# Patient Record
Sex: MALE | Race: WHITE
[De-identification: names, ages, dates, MRNs, and addresses within clinical notes are randomized per-mention and may not be internally consistent; named-entity substitution may affect disease eponyms.]

---

## 2017-06-12 ENCOUNTER — HOSPITAL ENCOUNTER (EMERGENCY)
Dept: HOSPITAL 80 - FED | Age: 82
Discharge: HOME | End: 2017-06-12
Payer: COMMERCIAL

## 2017-06-12 VITALS
DIASTOLIC BLOOD PRESSURE: 62 MMHG | HEART RATE: 68 BPM | TEMPERATURE: 98.6 F | SYSTOLIC BLOOD PRESSURE: 155 MMHG | OXYGEN SATURATION: 94 %

## 2017-06-12 VITALS — RESPIRATION RATE: 16 BRPM

## 2017-06-12 DIAGNOSIS — S46.002A: Primary | ICD-10-CM
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
## 2017-06-12 DIAGNOSIS — Z79.01: ICD-10-CM

## 2017-06-12 PROCEDURE — 99284 EMERGENCY DEPT VISIT MOD MDM: CPT

## 2017-06-12 PROCEDURE — A4565 SLINGS: HCPCS

## 2017-06-12 PROCEDURE — 73221 MRI JOINT UPR EXTREM W/O DYE: CPT

## 2017-06-12 NOTE — EDPHY
H & P


Time Seen by Provider: 06/12/17 17:28


HPI/ROS: 


CHIEF COMPLAINT:  Left shoulder pain





HISTORY OF PRESENT ILLNESS:  This is a 81-year-old male presenting to the 

emergency room from [facility] for left shoulder and 

trapezius pain. Patient's son is at the bedside he reports patient took a fall 1

-2 months ago hyperextended his left arm initial x-rays were negative but 

questioning whether he tore his rotator cuff to the amount of pain he is having 

with range of motion, pain radiates to his trapezius.  Patient states has been 

having intermittent pain since his fall but since Friday pain has increased 

with increased pain to his left trapezius.  Denies any new injury, no other 

complaints





REVIEW OF SYSTEMS:


Constitutional:  No fever, no chills.


Eyes:  No discharge. No blurred vision


ENT:  No sore throat.


Cardiovascular:  No chest pain, no palpitations.


Respiratory:  No cough, no shortness of breath.


Gastrointestinal:  No abdominal pain, no vomiting.


Genitourinary:  No hematuria.


Musculoskeletal:  No back pain. Left shoulder pain


Skin:  No rashes.


Neurological:  No headache.


Smoking Status: Never smoked


Physical Exam: 


General Appearance:  Alert, no distress.


Eyes:  Pupils equal and round no pallor or injection.


ENT, Mouth:  Mucous membranes moist.


Respiratory:  There are no retractions, lungs are clear to auscultation.


Cardiovascular:  Regular rate and rhythm.


Gastrointestinal:  Abdomen is soft and nontender, no masses, bowel sounds 

normal.


Neurological:  No focal deficits


Skin:  Warm and dry, no rashes.


Musculoskeletal:  Vertebral cervical spine nontender on palpation full range of 

motion without difficulty.  left trapezius tenderness on palpation, left 

shoulder pain with range of motion no obvious deformity positive CMS intact


Extremities:  symmetrical, decreased range of motion left shoulder joint no 

swelling positive CMS intact


Psychiatric:  Patient is oriented X 3, patient acting appropriate





Constitutional: 


 Initial Vital Signs











Temperature (C)  37.2 C   06/12/17 17:28


 


Heart Rate  78   06/12/17 17:28


 


Respiratory Rate  16   06/12/17 17:28


 


Blood Pressure  167/78 H  06/12/17 17:28


 


O2 Sat (%)  96   06/12/17 17:28








 











O2 Delivery Mode               Room Air














Allergies/Adverse Reactions: 


 





No Known Allergies Allergy (Verified 12/12/16 17:39)


 








Home Medications: 














 Medication  Instructions  Recorded


 


Acetaminophen [Acetaminophen Extra 500 mg PO Q6H PRN 12/12/16





Strength]  


 


Cholecalciferol Vit D3 [Vitamin D3 50,000 unit PO Q7D 12/12/16





(*)]  


 


Cyanocobalamin [Vitamin B12 (*)] 1,000 mcg PO DAILY@08 12/12/16


 


Fenofibrate [Tricor 145 mg (*)] 145 mg PO DAILY@20 12/12/16


 


Ferrous Sulfate [Ferrous Sulf 325 325 mg PO TID 12/12/16





MG (*)]  


 


Finasteride [Proscar 5 MG (*)] 5 mg PO DAILY@08 12/12/16


 


Pantoprazole Sodium [Protonix 40mg 40 mg PO BID 12/12/16





(*)]  


 


Pregabalin [Lyrica] 25 mg PO TID 12/12/16


 


Sertraline HCl [Zoloft 50mg (*)] 50 mg PO DAILY 12/12/16


 


Tamsulosin HCl [Flomax 0.4 MG (*)] 0.8 mg PO DAILY@17 12/12/16


 


metFORMIN HCL [Metformin HCl ER] 1,000 mg PO BIDMEAL 12/12/16


 


traMADol [Ultram 50 mg (*)] 50 mg PO Q6H PRN 12/12/16


 


Enoxaparin [Lovenox 80 MG (*)] 80 mg SC BID #14 syr 12/13/16


 


Warfarin Sodium [Coumadin 5MG (*)] 5 mg PO DAILY AT 4PM #0 tab 12/13/16


 


Lidocaine 5% [Lidoderm 5% Patch 1 ea TD DAILY #3 patch 06/12/17





(*)]  














Medical Decision Making





- Diagnostics


Imaging Results: 


 Imaging Impressions





Upper Extremity MRI  06/12/17 17:44


Impression: 


1. Large full-thickness tear involving the majority of the distal supraspinatus 

tendon retracted 2 cm. There is probably acute and chronic component with 

atrophy and edema. Moderate tendinopathy with mild partial tear infraspinatus 

tendon. Subacromial/subdeltoid bursitis.


2. Moderate tendinopathy intra-articular long head of the biceps tendon.


3. Partial tear posterior superior labrum and fraying and possible partial tear 

anterior inferior labrum. Minimal early degenerative change glenohumeral joint. 

Glenohumeral joint effusion.


4. Mild to moderate tendinopathy and partial tear distal subscapularis tendon.


5. Severe degenerative change acromioclavicular joint. Anterior curve to the 

acromion. Subacromial spur.


 


Results called and discussed with Bina Henson NP on 6/12/2017 at 1915 hours.











ED Course/Re-evaluation: 


Discussed ED  plan of care with patient and son:  Pain medicine, MRI of left 

shoulder rule out any rotator cuff injury





1915:  Spoke with Dr. Arzate in regards to findings on MRI for rotator cuff 

injury





1945:  Discussed these results with patient and patient's son, patient will go 

home with lidocaine patch and sling.  Patient has prescription for tramadol at 

[facility] he take that as needed with ibuprofen.  Also 

given number for Orthopedics to follow up with them.  Discussed all discharge 

instructions with patient and son---> stable, discharge home


Differential Diagnosis: 


Other differential diagnosis considered but not limited to shoulder dislocation

, clavicle fracture, and other ligamentous injuries





- Data Points


Medications Given: 


 








Discontinued Medications





Lidocaine (Lidoderm 5%)  1 ea TD DAILY MEL


   Stop: 12/10/17 08:59


   Last Admin: 06/12/17 20:34 Dose:  1 ea


Oxycodone/Acetaminophen (Percocet 5/325)  1 tab PO EDNOW ONE


   Stop: 06/12/17 17:44


   Last Admin: 06/12/17 17:54 Dose:  1 tab








Departure





- Departure


Disposition: Home, Routine, Self-Care


Clinical Impression: 


Injury of muscle or tendon of rotator cuff


Qualifiers:


 Encounter type: initial encounter Laterality: left Qualified Code(s): S46.002A 

- Unspecified injury of muscle(s) and tendon(s) of the rotator cuff of left 

shoulder, initial encounter





Condition: Good


Instructions:  Rotator Cuff Injury (ED), Rotator Cuff Tendinitis (ED)


Additional Instructions: 


1.  Follow up with Orthopedics this week


2.  You also have a prescription of tramadol take it as needed


Referrals: 


Patient,NotPresent [Unknown] - As per Instructions


[facility],. [Clinic] - As per Instructions


Dickson Rueda MD [Medical Doctor] - As per Instructions


Prescriptions: 


Lidocaine 5% [Lidoderm 5% Patch (*)] 1 ea TD DAILY #3 patch

## 2017-12-27 ENCOUNTER — HOSPITAL ENCOUNTER (EMERGENCY)
Dept: HOSPITAL 80 - FED | Age: 82
Discharge: HOME | End: 2017-12-27
Payer: COMMERCIAL

## 2017-12-27 VITALS
DIASTOLIC BLOOD PRESSURE: 76 MMHG | SYSTOLIC BLOOD PRESSURE: 169 MMHG | TEMPERATURE: 98.8 F | RESPIRATION RATE: 16 BRPM | OXYGEN SATURATION: 93 % | HEART RATE: 72 BPM

## 2017-12-27 DIAGNOSIS — S01.81XA: Primary | ICD-10-CM
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
## 2017-12-27 DIAGNOSIS — Z23: ICD-10-CM

## 2017-12-27 DIAGNOSIS — I10: ICD-10-CM

## 2017-12-27 DIAGNOSIS — Z79.01: ICD-10-CM
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
## 2017-12-27 LAB
INR PPP: 1.9 (ref 0.83–1.16)
PLATELET # BLD: 154 10^3/UL (ref 150–400)
PROTHROMBIN TIME: 21.9 SEC (ref 12–15)

## 2017-12-27 PROCEDURE — 0HQ1XZZ REPAIR FACE SKIN, EXTERNAL APPROACH: ICD-10-PCS

## 2017-12-27 NOTE — ASMTCMCOM
CM Note

 

CM Note                       

Notes:

Patient's son (Dickson) contacted and informed of patient's visit to the ER [phone].  He is 

unavailable to pick patient up at this time.  Transporation back to [facility] arranged with 

Lisa [phone] at .  

 

Date Signed:  12/27/2017 02:17 PM

Electronically Signed By:Brenda Mccormack RN

## 2017-12-27 NOTE — EDPHY
H & P


Stated Complaint: LTA, HEAD LAC


Source: Patient


Exam Limitations: No limitations





- Personal History


Current Tetanus/Diphtheria Vaccine: Unsure





- Medical/Surgical History


Hx Asthma: No


Hx Chronic Respiratory Disease: No


Hx Diabetes: No


Hx Cardiac Disease: No


Hx Renal Disease: No


Hx Cirrhosis: No


Hx Alcoholism: No


Hx HIV/AIDS: No


Hx Splenectomy or Spleen Trauma: No


Other PMH: HTN, cholecystectomy, anemia, fall 11/16





- Social History


Smoking Status: Never smoked


Time Seen by Provider: 12/27/17 11:26


HPI/ROS: 





CHIEF COMPLAINT:  Limited trauma activation, mechanical fall, head injury





HISTORY OF PRESENT ILLNESS:  The patient is anticoagulated for history of 

thromboembolic disease.  He presents to the emergency department as a limited 

trauma activation after he sustained a mechanical fall while walking earlier 

today.  The patient fell forward striking his head.  There was unknown loss of 

consciousness.  He sustained a laceration over his right eyebrow.  The patient 

denies any acute numbness or weakness. He denies antecedent chest pain or 

palpitations.  He denies any chest pain, back pain or extremity complaints.  

The patient does complain of a mild frontal headache. He does complain of mild 

cervical spine pain.





REVIEW OF SYSTEMS:


A comprehensive 10 point review of systems is otherwise negative aside from 

elements mentioned in the history of present illness. (Earl Leonard)





- Physical Exam


Exam: 





General Appearance:  Alert, no distress


Head:  5 cm laceration noted over the right eyebrow, stellate


Eyes:  Pupils equal, round, reactive


ENT, Mouth:  No hemotympanum, no oral trauma


Neck:  Nontender, trachea midline


Respiratory:  No chest wall tender, subcutaneous air, lungs clear bilaterally


Cardiovascular:  Regular rate and rhythm


Abdomen:  Abdomen is soft and nontender, pelvis stable


Skin:  Superficial extremity abrasions


Back:  No midline T/L/S pain


Extremities:  Nontender, full range of motion


Neurological:  A&Ox3, normal motor function, normal sensory exam (Earl Leonard)


Constitutional: 


 Initial Vital Signs











Temperature (C)  37.1 C   12/27/17 11:25


 


Heart Rate  72   12/27/17 11:25


 


Respiratory Rate  16   12/27/17 11:25


 


Blood Pressure  169/76 H  12/27/17 11:25


 


O2 Sat (%)  93   12/27/17 11:25








 











O2 Delivery Mode               Room Air














Allergies/Adverse Reactions: 


 





No Known Allergies Allergy (Verified 12/12/16 17:39)


 








Home Medications: 














 Medication  Instructions  Recorded


 


Acetaminophen [Acetaminophen Extra 500 mg PO Q6H PRN 12/12/16





Strength]  


 


Cholecalciferol Vit D3 [Vitamin D3 50,000 unit PO Q7D 12/12/16





(*)]  


 


Cyanocobalamin [Vitamin B12 (*)] 1,000 mcg PO DAILY@08 12/12/16


 


Fenofibrate [Tricor 145 mg (*)] 145 mg PO DAILY@20 12/12/16


 


Ferrous Sulfate [Ferrous Sulf 325 325 mg PO TID 12/12/16





MG (*)]  


 


Finasteride [Proscar 5 MG (*)] 5 mg PO DAILY@08 12/12/16


 


Pantoprazole Sodium [Protonix 40mg 40 mg PO BID 12/12/16





(*)]  


 


Pregabalin [Lyrica] 25 mg PO TID 12/12/16


 


Sertraline HCl [Zoloft 50mg (*)] 50 mg PO DAILY 12/12/16


 


Tamsulosin HCl [Flomax 0.4 MG (*)] 0.8 mg PO DAILY@17 12/12/16


 


metFORMIN HCL [Metformin HCl ER] 1,000 mg PO BIDMEAL 12/12/16


 


traMADol [Ultram 50 mg (*)] 50 mg PO Q6H PRN 12/12/16


 


Enoxaparin [Lovenox 80 MG (*)] 80 mg SC BID #14 syr 12/13/16


 


Warfarin Sodium [Coumadin 5MG (*)] 5 mg PO DAILY AT 4PM #0 tab 12/13/16


 


Lidocaine 5% [Lidoderm 5% Patch 1 ea TD DAILY #3 patch 06/12/17





(*)]  














Medical Decision Making





- Diagnostics


Imaging Results: 


 Imaging Impressions





Cervical Spine CT  12/27/17 11:29


Impression:    


1. Stable mild age-related atrophy.


2. No hemorrhage, mass effect, or definite acute peripheral infarct.


3. No evidence of acute cervical spine injury.


4. Stable degenerative disk disease.


 


If symptoms worsen, additional imaging may be necessary. 


 


Findings discussed with Earl Leonard  at  12:11 hour, 12/27/2017.








Head CT  12/27/17 11:29


Impression:    


1. Stable mild age-related atrophy.


2. No hemorrhage, mass effect, or definite acute peripheral infarct.


3. No evidence of acute cervical spine injury.


4. Stable degenerative disk disease.


 


If symptoms worsen, additional imaging may be necessary. 


 


Findings discussed with Earl Leonard  at  12:11 hour, 12/27/2017.











Procedures: 





Procedure:  Laceration repair.





Verbal consent was obtained from the patient. The 5 cm laceration on the right 

forehead was anesthetized in the usual fashion. The wound was irrigated, draped 

and explored to its base with a gloved finger. There were no deep structures 

involved.  No tendon injury was identified.  The wound was repaired with 5-0 

rapidly dissolving Vicryl, 9 deep sutures, 5 0 Prolene, 8 simple interrupted 

sutures.  The wound repair was moderately complex multilayer closure.  The 

procedure was performed by myself. (Rock Colon)


ED Course/Re-evaluation: 





The patient presents to the ED is limited trauma activation after mechanical 

fall.  The patient is anticoagulated in sustained a head injury and associated 

laceration.  The patient arrived and was noted to be neurologically intact.  

Given his age in anticoagulation status he was taken for a CT scan of the head 

which demonstrates no evidence of intracranial hemorrhage or skull fracture.  

The patient did have some mild midline neck tenderness and a CT scan of the 

cervical spine was also performed which demonstrated no evidence of an acute 

fracture.  The patient had no additional traumatic injuries noted on his 

examination. The patient's laceration was repaired by the physician assistant 

under my supervision.





The patient underwent serial examinations in the ED over a 2 hr period.  At 2:

00 p.m. he is in no acute distress.  His laceration has been repaired.  No 

additional traumatic injury is appreciated on a repeat tertiary survey.





The patient will be discharged home with instructions to return to the ED in 5 

days for suture removal.  He should return sooner for any headache, vomiting, 

abnormal behavior, new pain or other concerns.





The patient will be transferred back to [facility].  He will follow up with 

his physician Dr. Villegas as scheduled.





 (Earl Leonard)


Differential Diagnosis: 





Differential diagnosis considered includes intracranial hemorrhage, skull 

fracture, cervical spine fracture, spinal cord injury, neurovascular injury, 

anemia, metabolic abnormality (Earl Leonard)





- Data Points


Laboratory Results: 


 Laboratory Results





 12/27/17 12:00 





 12/27/17 12:00 





 











  12/27/17 12/27/17 12/27/17





  12:00 12:00 12:00


 


WBC      6.96 10^3/uL 10^3/uL





     (3.80-9.50) 


 


RBC      3.48 10^6/uL L 10^6/uL





     (4.40-6.38) 


 


Hgb      11.3 g/dL L g/dL





     (13.7-17.5) 


 


Hct      35.0 % L %





     (40.0-51.0) 


 


MCV      100.6 fL H fL





     (81.5-99.8) 


 


MCH      32.5 pg pg





     (27.9-34.1) 


 


MCHC      32.3 g/dL L g/dL





     (32.4-36.7) 


 


RDW      14.0 % %





     (11.5-15.2) 


 


Plt Count      154 10^3/uL 10^3/uL





     (150-400) 


 


MPV      10.3 fL fL





     (8.7-11.7) 


 


Neut % (Auto)      74.8 % H %





     (39.3-74.2) 


 


Lymph % (Auto)      16.1 % %





     (15.0-45.0) 


 


Mono % (Auto)      6.2 % %





     (4.5-13.0) 


 


Eos % (Auto)      1.7 % %





     (0.6-7.6) 


 


Baso % (Auto)      0.6 % %





     (0.3-1.7) 


 


Nucleat RBC Rel Count      0.0 % %





     (0.0-0.2) 


 


Absolute Neuts (auto)      5.21 10^3/uL 10^3/uL





     (1.70-6.50) 


 


Absolute Lymphs (auto)      1.12 10^3/uL 10^3/uL





     (1.00-3.00) 


 


Absolute Monos (auto)      0.43 10^3/uL 10^3/uL





     (0.30-0.80) 


 


Absolute Eos (auto)      0.12 10^3/uL 10^3/uL





     (0.03-0.40) 


 


Absolute Basos (auto)      0.04 10^3/uL 10^3/uL





     (0.02-0.10) 


 


Absolute Nucleated RBC      0.00 10^3/uL 10^3/uL





     (0-0.01) 


 


Immature Gran %      0.6 % %





     (0.0-1.1) 


 


Immature Gran #      0.04 10^3/uL 10^3/uL





     (0.00-0.10) 


 


PT    21.9 SEC H SEC  





    (12.0-15.0)  


 


INR    1.90  H   





    (0.83-1.16)  


 


APTT    29.2 SEC SEC  





    (23.0-38.0)  


 


Sodium  142 mEq/L mEq/L    





   (134-144)   


 


Potassium  4.1 mEq/L mEq/L    





   (3.5-5.2)   


 


Chloride  106 mEq/L mEq/L    





   ()   


 


Carbon Dioxide  25 mEq/l mEq/l    





   (22-31)   


 


Anion Gap  11 mEq/L mEq/L    





   (8-16)   


 


BUN  13 mg/dL mg/dL    





   (7-23)   


 


Creatinine  0.9 mg/dL mg/dL    





   (0.7-1.3)   


 


Estimated GFR  > 60     





    


 


Glucose  125 mg/dL H mg/dL    





   ()   


 


Calcium  8.6 mg/dL mg/dL    





   (8.5-10.4)   














Departure





- Departure


Disposition: Home, Routine, Self-Care


Clinical Impression: 


 Fall, Facial laceration





Condition: Good


Instructions:  Care For Your Stitches (ED), Laceration (ED), Head Injury (ED)


Additional Instructions: 


1.  Suture removal in 5 days, you can return to the emergency department to 

have this performed.


2.  Return to the ED sooner for any severe headache, vomiting, numbness, 

weakness, new pain or other concerns.


3.  Please follow up with your primary care provider as scheduled.

## 2018-09-06 ENCOUNTER — HOSPITAL ENCOUNTER (EMERGENCY)
Dept: HOSPITAL 80 - FED | Age: 83
Discharge: HOME | End: 2018-09-06
Payer: COMMERCIAL

## 2018-09-06 VITALS — DIASTOLIC BLOOD PRESSURE: 62 MMHG | SYSTOLIC BLOOD PRESSURE: 144 MMHG
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
## 2018-09-06 DIAGNOSIS — S70.02XA: Primary | ICD-10-CM
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
## 2018-09-06 NOTE — ASMTCMCOM
CM Note

 

CM Note                       

Notes:

9/6/2018 Case Management Note



RN requested assistance with transport.  Pt resides at [facility].  Phone call to [facility], instructed by [facility] staff to arrange transport with [facility] stretcher to bill [facility].  Phone call to [facility].   at 2030 tonight.  PCS completed indicating [facility] to be 

billed for transport costs.



No further case management d/c needs identified.

 

Date Signed:  09/06/2018 06:56 PM

Electronically Signed By:Davida Fuller RN

## 2018-09-06 NOTE — EDPHY
H & P


Time Seen by Provider: 18 18:00


Constitutional: 


 Initial Vital Signs











Temperature (C)  36.8 C   18 18:01


 


Heart Rate  74   18 18:01


 


Respiratory Rate  18   18 18:01


 


Blood Pressure  147/66 H  18 18:01


 


O2 Sat (%)  95   18 18:01








 











O2 Delivery Mode               Room Air














Allergies/Adverse Reactions: 


 





No Known Allergies Allergy (Verified 16 17:39)


 








Home Medications: 














 Medication  Instructions  Recorded


 


Acetaminophen [Acetaminophen Extra 500 mg PO Q6H PRN 16





Strength]  


 


Cholecalciferol Vit D3 [Vitamin D3 50,000 unit PO Q7D 16





(*)]  


 


Cyanocobalamin [Vitamin B12 (*)] 1,000 mcg PO DAILY@08 16


 


Finasteride [Proscar 5 MG (*)] 5 mg PO DAILY@16


 


Pantoprazole Sodium [Protonix 40mg 40 mg PO BID 16





(*)]  


 


Pregabalin [Lyrica] 25 mg PO TID 16


 


Sertraline HCl [Zoloft 50mg (*)] 50 mg PO DAILY 16


 


Tamsulosin HCl [Flomax 0.4 MG (*)] 0.8 mg PO DAILY@17 16


 


metFORMIN HCL [Metformin HCl ER] 1,000 mg PO BIDMEAL 16


 


traMADol [Ultram 50 mg (*)] 50 mg PO Q6H PRN 16


 


Acetaminophen Extra Strength  18


 


Trolamine Salicylate/Aloe Vera  18





[Aspercreme 10% Cream]  














Medical Decision Making





- Diagnostics


Imaging Results: 


 Imaging Impressions





Hip X-Ray  18 18:04


Impression: Heterotopic bone formation over the left greater trochanter 

compatible prior trauma. No acute fracture identified. Right sacroiliac joint 

osteoarthritis.











Imaging: I viewed and interpreted images myself


ED Course/Re-evaluation: 





CHIEF COMPLAINT:  Left hip pain 





HISTORY OF PRESENT ILLNESS: 





This patient is an 82 arriving via EMS from [facility] who complains of left 

hip pain following a mechanical fall earlier today. His wife fell this evening 

and he tried to help her get up. He fell backwards onto his left hip. He 

endorses discomfort in his left hip and left inguinal region. He denies any 

other injuries from today's fall. He felt well prior to the incident, no 

lightheadedness, chest pain, shortness of breath. The fall was purely 

mechanical. No other recent trauma or illness. No further complaints. 





REVIEW OF SYSTEMS:  





A comprehensive 10 system review of systems is otherwise negative aside from 

elements mentioned in the history of present illness and medical decision 

making.





PHYSICAL EXAM:  





HR, BP, O2 Sat, RR.  Temp noted


General Appearance:  Alert, well hydrated, appropriate, and non-toxic appearing.


Head:  Atraumatic without scalp tenderness or obvious injury


Eyes:  Pupils equal, round, reactive to light and accommodation, EOMI, no trauma

, no injection.


Ears:  Clear bilaterally, no perforation, normal landmarks


Nose:  Atraumatic, no rhinorrhea, clear.


Throat:  There is no erythema or exudates, no lesions, normal tonsils, mucus 

membranes moist.


Neck:  Supple, nontender, no lymphadenopathy.


Respiratory:  No retractions, no distress, no wheezes, and no accessory muscle 

use.  Lungs are clear to auscultation bilaterally.


Cardiovascular:  Regular rate and rhythm, no murmurs, rubs, or gallops. 

Bilateral carotid, radial, dorsalis pedis, and posterior tibial pulses intact. 

Good capillary refill all extremities.


Gastrointestinal:  Abdomen is soft, nontender, non-distended, no masses.


Musculoskeletal:  Normal active ROM of all extremities, atraumatic.


Neurological:  Alert, appropriate, and interactive. Nonfocal neuro exam. 


Skin:  No rashes, good turgor, no nodules on palpation.





Past medical history: Hypertension, diabetes mellitus, anemia 


Past surgical history: Cholecystectomy


Family history: Noncontributory. 


Social history: Lives in a senior living facility with his wife. . 

Retired. 





DIFFERENTIAL DIAGNOSIS:  





The differential diagnosis for the patient's trauma included but was not 

limited to intracranial injury, long bone and pelvic bone fractures, spinal 

injury, intra-abdominal injury, and intra-thoracic injury.





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 

today. Plan for x-ray left hip. 





18:34  Reviewed hip x-ray. No fracture or other acute processes. Possible prior 

trauma to L hip, nothing acute. Radiologist report concurs. 





Plan to discharge home in good condition. Plan to administer 600mg PO ibuprofen 

for pain relief. Follow up and return precautions discussed. He is comfortable 

with this plan. 





- Data Points


Medications Given: 


 








Discontinued Medications





Ibuprofen (Motrin)  600 mg PO EDNOW ONE


   Stop: 18 20:32


   Last Admin: 18 20:35 Dose:  600 mg








Departure





- Departure


Disposition: Home, Routine, Self-Care


Clinical Impression: 


Contusion of left hip


Qualifiers:


 Encounter type: initial encounter Qualified Code(s): S70.02XA - Contusion of 

left hip, initial encounter





Condition: Good


Instructions:  Hip Contusion (ED)


Additional Instructions: 


1. Follow up with your primary care provider for symptoms unresolved. 





2. Return to the emergency department for severe pain, difficulty walking, 

numbness or tingling in your extremities, or other worsening of condition. 


Referrals: 


Michelle Brantley MD [[facility] Primary Care Provider] - As per Instructions


Report Scribed for: Joaquín Hernandez


Report Scribed by: Elin Molina


Date of Report: 18


Time of Report: 18:04